# Patient Record
Sex: FEMALE | Race: WHITE | NOT HISPANIC OR LATINO
[De-identification: names, ages, dates, MRNs, and addresses within clinical notes are randomized per-mention and may not be internally consistent; named-entity substitution may affect disease eponyms.]

---

## 2018-11-14 ENCOUNTER — APPOINTMENT (OUTPATIENT)
Dept: NEUROLOGY | Facility: CLINIC | Age: 53
End: 2018-11-14
Payer: COMMERCIAL

## 2018-11-14 DIAGNOSIS — G62.9 POLYNEUROPATHY, UNSPECIFIED: ICD-10-CM

## 2018-11-14 PROCEDURE — 99204 OFFICE O/P NEW MOD 45 MIN: CPT

## 2021-02-11 ENCOUNTER — APPOINTMENT (OUTPATIENT)
Dept: GASTROENTEROLOGY | Facility: CLINIC | Age: 56
End: 2021-02-11

## 2021-05-27 ENCOUNTER — RESULT REVIEW (OUTPATIENT)
Age: 56
End: 2021-05-27

## 2022-01-14 ENCOUNTER — NON-APPOINTMENT (OUTPATIENT)
Age: 57
End: 2022-01-14

## 2022-02-17 ENCOUNTER — APPOINTMENT (OUTPATIENT)
Dept: HEPATOLOGY | Facility: CLINIC | Age: 57
End: 2022-02-17
Payer: COMMERCIAL

## 2022-02-17 VITALS
SYSTOLIC BLOOD PRESSURE: 131 MMHG | WEIGHT: 180 LBS | OXYGEN SATURATION: 98 % | HEIGHT: 64 IN | BODY MASS INDEX: 30.73 KG/M2 | RESPIRATION RATE: 16 BRPM | HEART RATE: 66 BPM | DIASTOLIC BLOOD PRESSURE: 77 MMHG | TEMPERATURE: 97.2 F

## 2022-02-17 DIAGNOSIS — Z82.49 FAMILY HISTORY OF ISCHEMIC HEART DISEASE AND OTHER DISEASES OF THE CIRCULATORY SYSTEM: ICD-10-CM

## 2022-02-17 DIAGNOSIS — Z83.3 FAMILY HISTORY OF DIABETES MELLITUS: ICD-10-CM

## 2022-02-17 DIAGNOSIS — Z85.3 PERSONAL HISTORY OF MALIGNANT NEOPLASM OF BREAST: ICD-10-CM

## 2022-02-17 DIAGNOSIS — Z87.898 PERSONAL HISTORY OF OTHER SPECIFIED CONDITIONS: ICD-10-CM

## 2022-02-17 DIAGNOSIS — B49 UNSPECIFIED MYCOSIS: ICD-10-CM

## 2022-02-17 DIAGNOSIS — Z83.49 FAMILY HISTORY OF OTHER ENDOCRINE, NUTRITIONAL AND METABOLIC DISEASES: ICD-10-CM

## 2022-02-17 DIAGNOSIS — Z80.42 FAMILY HISTORY OF MALIGNANT NEOPLASM OF PROSTATE: ICD-10-CM

## 2022-02-17 PROCEDURE — 99072 ADDL SUPL MATRL&STAF TM PHE: CPT

## 2022-02-17 PROCEDURE — 99205 OFFICE O/P NEW HI 60 MIN: CPT

## 2022-02-23 PROBLEM — Z82.49 FH: CHF (CONGESTIVE HEART FAILURE): Status: ACTIVE | Noted: 2022-02-23

## 2022-02-23 PROBLEM — Z80.42 FAMILY HISTORY OF MALIGNANT NEOPLASM OF PROSTATE: Status: ACTIVE | Noted: 2022-02-23

## 2022-02-23 PROBLEM — Z83.49 FAMILY HISTORY OF THYROID DISEASE: Status: ACTIVE | Noted: 2022-02-23

## 2022-02-23 PROBLEM — Z87.898 HISTORY OF MARIJUANA USE: Status: ACTIVE | Noted: 2022-02-23

## 2022-02-23 PROBLEM — Z82.49 FAMILY HISTORY OF HYPERTENSION: Status: ACTIVE | Noted: 2022-02-23

## 2022-02-23 LAB
25(OH)D3 SERPL-MCNC: 30.4 NG/ML
AFP-TM SERPL-MCNC: 2.6 NG/ML
ALBUMIN SERPL ELPH-MCNC: 4.4 G/DL
ALP BLD-CCNC: 92 U/L
ALT SERPL-CCNC: 23 U/L
ANION GAP SERPL CALC-SCNC: 13 MMOL/L
AST SERPL-CCNC: 32 U/L
BASOPHILS # BLD AUTO: 0.03 K/UL
BASOPHILS NFR BLD AUTO: 0.9 %
BILIRUB DIRECT SERPL-MCNC: 0.2 MG/DL
BILIRUB SERPL-MCNC: 1 MG/DL
BUN SERPL-MCNC: 11 MG/DL
CALCIUM SERPL-MCNC: 9.5 MG/DL
CHLORIDE SERPL-SCNC: 106 MMOL/L
CO2 SERPL-SCNC: 24 MMOL/L
CREAT SERPL-MCNC: 0.62 MG/DL
EOSINOPHIL # BLD AUTO: 0.11 K/UL
EOSINOPHIL NFR BLD AUTO: 3.4 %
GLUCOSE SERPL-MCNC: 82 MG/DL
HCT VFR BLD CALC: 44.1 %
HGB BLD-MCNC: 13.8 G/DL
IMM GRANULOCYTES NFR BLD AUTO: 0.3 %
INR PPP: 1.07 RATIO
LYMPHOCYTES # BLD AUTO: 0.67 K/UL
LYMPHOCYTES NFR BLD AUTO: 21 %
MAN DIFF?: NORMAL
MCHC RBC-ENTMCNC: 30.3 PG
MCHC RBC-ENTMCNC: 31.3 GM/DL
MCV RBC AUTO: 96.9 FL
MITOCHONDRIA AB SER IF-ACNC: NORMAL
MONOCYTES # BLD AUTO: 0.18 K/UL
MONOCYTES NFR BLD AUTO: 5.6 %
NEUTROPHILS # BLD AUTO: 2.19 K/UL
NEUTROPHILS NFR BLD AUTO: 68.8 %
PLATELET # BLD AUTO: NORMAL K/UL
POTASSIUM SERPL-SCNC: 4.4 MMOL/L
PROT SERPL-MCNC: 7.1 G/DL
PT BLD: 12.6 SEC
RBC # BLD: 4.55 M/UL
RBC # FLD: 14 %
SODIUM SERPL-SCNC: 144 MMOL/L
WBC # FLD AUTO: 3.19 K/UL
ZINC SERPL-MCNC: 85 UG/DL

## 2022-02-23 NOTE — HISTORY OF PRESENT ILLNESS
[de-identified] : Ms. Hanson is a 55 yo F with obesity, hypothyroidism, history of breast cancers (left breast cancer s/p mastectomy and left axillary lymph node dissection  followed by radiation, chemotherapy, and tamoxifen; right breast cancer s/p mastectomy  followed by radiation and chemotherapy, with metastatic disease to the chest wall followed by experimental drug therapy as part of a clinical trial in ; also s/p bilateral breast implants), prior cholecystectomy (), chronic iron deficiency anemia, who is being seen for consultation due to recently diagnosed cirrhosis complicated by esophageal and rectal varices as well as indeterminate liver lesions.\par \par Oncology: Dr. Bebo Quach ([facility])\par GI: Dr. Sung Juarez = referring physician\par \par CT [facility] No significant change compared to the previous study. No evidence of metastatic disease. Liver contours are intact. No solid appearing liver lesions. Low-density cystic foci redemonstrated with largest within the posterior aspect of the superior right lobe measuring 2.5 cm unchanged compared to the prior exam probably simple and complex liver cysts. Moderate splenomegaly. \par \par EGD (21): Normal-appearing duodenum, biopsied. Mild antral gastritis, with body and antrum biopsied. Grade 2 esophageal varices without stigmata of bleeding. Banding was not performed.\par \par Colonoscopy (21): Fair prep. Normal-appearing terminal ileum. 1 cm ascending colon polyp removed with hot snare. 8 mm descending colon polyp removed with cold snare. Oozing post polypectomy with two clips placed with resolution of the oozing. One additional clip was attempted to be placed but did not adhere to the mucosa. Sigmoid colon diverticulosis. Small rectal varices.\par \par Abdominal US (21): Heterogeneous echotexture in the liver. No solid lesion in the liver. Several cysts in the liver, largest in the right lobe measuring 2.2 cm with septation. Another cyst in the right lobe measures 1.9 cm. A cyst in the left lobe measures 1.7 cm. Splenomegaly (20.2 cm). Cholecystectomy. No ascites.\par \par US elastography (21):\par 1. Limited study due to overlying bowel gas.\par 2. Significantly heterogeneous liver with coarsening and lobulated margins raising concern for hepatocellular disease/cirrhosis and possible inhomogeneous steatosis with fat sparing. Underlying masses or other diffuse infiltrative process are not excluded on the basis of this exam and furtheer evaluation with enhanced MRI if no contraindication versus multiphase CT is advised.\par 3. Numerous additional cysts, largest (2.6 cm) septated in the right hepatic lobe but no associated color flow.\par 4. Liver stiffness values (median 7.29 kPA) in the range correlating with mild to moderate fibrosis, however assessment is limited due to poor sonographic window as discussed above.\par 5. Status post cholecystectomy without duct dilatation.\par Additional findings: Normal hepatopedal flow in the main portal vein. \par \par Capsule endoscopy (21): Erythematous wall in the duodenum at 00:15:52. Small non-bleeding angioectasia in the ileum at 1:49:59. No other findings.\par \par MRI abdomen with and without contrast with Eovist (21, [facility]): Cirrhosis with portal hypertension manifested by small caliber varices and splenomegaly. No ascites. Patent hepatic vasculature. There are several <2.5 cm lobulated cystic lesions with occasional thin septations scattered throughout the liver, most consistent with biliary hamartomas. There are multiple <1.5 cm lesions scattered throughout the liver, near isointense on noncontrast images, a few showing arterial hyperenhancement, but are best shown as enhancing foci on hepatocyte phase images. No washout. Lesions are consistent with dysplastic nodules.\par \par MRI abdomen with and without contrast with Eovist (21, [facility]): Cirrhosis. There is diffuse signal drop out in phase relative to out of phase consistent with iron deposition. Evidence of portal hypertension including small esophageal varices in the distal esophagus and splenomegaly. No ascites. Patent hepatic vasculature. No evidence of HCC. There are numerous arterially isointense, progressively enhancing nodules demonstrating hyperintense signal on hepatobiliary phase and no restricted diffusion scattered throughout the liver. These are consistent with regenerative nodules. There are two arterially enhancing subcentimeter subcapsular foci in segment 7 measuring 0.6 x 0.8 cm and 0.6 x 0.6 cm respectively which also demonstrate retained contrast on the hepatobiliary phase and no restricted diffusion. These could represent dysplastic or regenerative nodules. Several stable and septated cysts are also scattered throughout the liver.\par \par CT chest/abdomen/pelvis with contrast (2/15/22, [facility]): No evidence of metastatic disease. Cirrhotic liver containing simplee cysts, without evidence of HCC. Portal hypertension manifested by coronary varices, without discernible portal thrombus or ascites. Normal spleen. \par \par She reports that she has had splenomegaly noted on imaging for approximately 2 years and thrombocytopenia noted on labs for approximately 4 years, both first noted by her oncologist. In the spring of , she had an episode of melena that self-resolved, but prompted her oncologist to refer her to Dr. Juarez, who did an EGD and colonoscopy (as above) with findings including esophageal and rectal varices. This led to her diagnosis of cirrhosis and further liver imaging, as above. Per notes from Dr. Juarez, gastric biopsies were negative for H pylori and duodenal biopsies were negative for celiac sprue.\par \par She has not had any other episodes of overt bleeding. No history of hematemesis. She denies any history of jaundice, scleral icterus, abdominal distension, paracentesis, or lower extremity edema. She has occasional, sporadic abdominal cramping. She denies any history of overt confusion, but says that for the past year, she has become more forgetful and also sometimes has difficulty concentrating.\par \par She says that Dr. Juarez discussed the possibility of beta-blocker therapy with her but held off, possibly because she says she has episodes of vasovagal syncope, with intermittent lightheadedness and a "tendency to faint".\par \par She weighed 206 lbs at her heaviest but has intentionally lost 20 lbs in the past 6 months. She admits that she doesn't "eat healthy all the time" and that "food is my addiction."\par \par She used to be a "social drinker" but says that during the COVID-19 pandemic, she began to drink alcohol more regularly, initially 1 drink 2 nights/week and then up to heaviest drinking of 2-3 drinks/week, typically martinis, hard seltzer, or occasionally beer. She quit drinking alcohol altogether after Dr. Juarez diagnosed her with cirrhosis, apart from a single drink while on vacation for [place] Years and apart from "zero alcohol" beer.\par \par She has no known family history of liver disease. Her father  in  of CHF and had a prior history of a pacemaker implantation, thyroid disease, and prostate cancer. Her paternal grandmother had ovarian cancer. Her mother is alive, age 86 years, and has mild cognitive impairment, hypertension, diabetes, and dyslipidemia. Her older brother has thyroid disease. 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 She is  and has been with her wife, Paula, for the past 30 years. No children. She admits to using marijuana recreationally in the past and being interested in trying medical marijuana. She does not have any history of intranasal or injection drug use.\[facility]:\[facility] 21: Na 141, Cr 0.59, ALP 79, AST 19, ALT 13, TB 0.5, TP 6.2, Alb 3.7, INR 1.0, WBC 2.2, Hb 10.4, HCT 32.0, MCV 79.4, RDW 16.4, Plt 112, transferrin saturation 9%, ferritin 7, AFP 2.7\[facility] 22: Na 141, Cr 0.60, ALP 95, AST 31, ALT 22, TB 0.9, TP 7.0, Alb 4.4, WBC 4.0, Hb 13.7, HCT 39.6, MCV 87, RDW 12.8, Plt 86, transferrin saturation 27%, ferritin 206, AFP <2.5\[facility] \[facility] Additional labs: HAV IgM non-reactive, HAV total Ab non-reactive, HBsAg non-reactive, HBcIgM non-reactive, HBcAb non-reactive, HBsAb non-reactive, HCV Ab non-reactive, CAMILLE negative, ASMA negative, LKM negative, AMA negative, A1AT 155, ceruloplasmin 38, CEA 1.5 (22)

## 2022-02-23 NOTE — ASSESSMENT
[FreeTextEntry1] : 55 yo F with obesity, hypothyroidism, history of breast cancers (left breast cancer s/p mastectomy and left axillary lymph node dissection 2003 followed by radiation, chemotherapy, and tamoxifen; right breast cancer s/p mastectomy 2008 followed by radiation and chemotherapy, with metastatic disease to the chest wall followed by experimental drug therapy as part of a clinical trial in 2012; also s/p bilateral breast implants), prior cholecystectomy (2015), chronic iron deficiency anemia, and recently diagnosed cirrhosis complicated by non-bleeding esophageal and rectal varices, splenomegaly with thrombocytopenia, and indeterminate liver lesions, as well as possible covert hepatic encephalopathy.\par \par # Possible covert hepatic encephalopathy:\par - No history of overt HE, but with reported difficulty with concentration and forgetfulness for the past year.\par - Start trial of lactulose, and discussed titrating as needed to achieve/maintain 2-4 stools/day.\par - Will evaluate for any symptomatic improvement after a few weeks of lactulose therapy, and can consider addition of rifaximin if needed.\par \par # Non-bleeding esophageal and rectal varices:\par - She is not hypotensive, but appears to be a poor candidate for NSBB for primary prophylaxis due to a history of episodic vasovagal syncope and intermittent lightheadedness, which NSBB may exacerbate.\par - Recommended repeat EGD for serial band ligation of varices as alternative primary prophylaxis. She will be referred back to Dr. Juarez for procedure(s).\par - We briefly discussed that TIPS can be used to alleviate portal hypertension and prevent bleeding from varices, but that this is not currently indicated given that she has no prior episodes of hemorrhage or other indications for elective TIPS.\par \par # Etiology of cirrhosis: Likely secondary to nonalcoholic steatohepatitis (CHARLES) given heterogeneous steatosis noted on prior imaging. No significant alcohol history (no reported consumption in excess of 7 drinks/week) and no serologic evidence of chronic HBV or HCV. Prior laboratory work-up done by Dr. Juarez has excluded other less common causes of cirrhosis. It is also possible that prior chemotherapy may have contributed to development of cirrhosis and/or portal hypertension. I am not currently recommending pursuing liver biopsy for further evaluation as this is unlikely to  based on her lab results.\par \par # Indeterminate liver lesions:\par - Based on her last MRI (12/2021), she appears to have two subcentimeter lesions with arterial enhancement, without washout or restricted diffusion, with retained contrast on the hepatobiliary phase. LIRADS classification was not used in the report but by description these are LR-3 lesions (indeterminate). Additional arterially isointense nodules with hyperintensity on hepatobiliary phase were also noted. \par - The lesions appear to be overall stable compared to her prior MRI (7/2021) when they were also noted to be <1.5 cm in size.\par - More recent CT (2/2022) appears to have been a single phase study and therefore was insufficient for surveillance and further characterization of these lesions.\par - Recommend repeat MRI in 6 months (6/2022) for surveillance, ordered today.\par \par # Cirrhosis:\par - She currently has Child-Salas class A cirrhosis with MELD-Na score 6 based on her last labs.\par - No ascites on prior imaging or on exam today. Not currently on diuretics.\par - We discussed that she does not have any indication for liver transplantation evaluation at this time. We discussed the potential risks of development of ascites, SBP or othr infections, variceal hemorrhage, overt hepatic encephalopathy, PVT, and HCC and alert signs for her to seek medical attention.\par \par # Health maintenance in cirrhosis:\par - Ms. GLASS was counseled to: abstain from alcohol and all illicit drugs; avoid use of herbal and dietary supplements due to potential hepatotoxicity; limit use of acetaminophen to <2 grams per day; avoid use of nonsteroidal antiinflammatory drugs (NSAIDs) as these can lead to diuretic resistance and precipitate renal dysfunction in patients with advanced liver disease; avoid eating any unpasteurized dairy products; avoid eating any raw or undercooked eggs, fish, poultry, or meat; and avoid eating raw/steamed oysters or other shellfish to avoid risk of Vibrio infection.\par \par Next follow-up: 2 months

## 2022-02-23 NOTE — CONSULT LETTER
[Dear  ___] : Dear  [unfilled], [( Thank you for referring [unfilled] for consultation for _____ )] : Thank you for referring [unfilled] for consultation for [unfilled] [Please see my note below.] : Please see my note below. [Consult Closing:] : Thank you very much for allowing me to participate in the care of this patient.  If you have any questions, please do not hesitate to contact me. [FreeTextEntry2] : Dr. Sung Juarez [FreeTextEntry1] : She appears to be a risky candidate for primary prophylaxis against variceal hemorrhage with beta-blocker therapy due to nilda history of vasovagal syncope and intermittent lightheadedness. I suggest that you repeat EGD serially for band ligation for alternative primary prophylaxis. If you prefer that I perform these procedures, please let me know and I would be happy to accommodate.\par \par I am planning to repeat her MRI in June for surveillance of the indeterminate liver lesions. [FreeTextEntry3] : Sincerely,\par \par Gisel Rodriguez M.D., Ph.D.\par Director, Women's Liver Health Program, [facility]\par Transplant Hepatology, [facility]\par  of Medicine\par [employer] [facility] [employer] [facility]\par [address]\[place] [address]\par Tel: [phone]\par Fax: [phone]\par Cell: [phone]\par E-mail: [email or web address]

## 2022-06-16 ENCOUNTER — RX ONLY (RX ONLY)
Age: 57
End: 2022-06-16

## 2022-06-16 ENCOUNTER — OFFICE (OUTPATIENT)
Dept: URBAN - METROPOLITAN AREA CLINIC 100 | Facility: CLINIC | Age: 57
Setting detail: OPHTHALMOLOGY
End: 2022-06-16
Payer: COMMERCIAL

## 2022-06-16 DIAGNOSIS — D21.0: ICD-10-CM

## 2022-06-16 DIAGNOSIS — D49.2: ICD-10-CM

## 2022-06-16 PROCEDURE — 92002 INTRM OPH EXAM NEW PATIENT: CPT | Performed by: OPHTHALMOLOGY

## 2022-06-16 PROCEDURE — 92285 EXTERNAL OCULAR PHOTOGRAPHY: CPT | Performed by: OPHTHALMOLOGY

## 2022-06-16 PROCEDURE — 67840 REMOVE EYELID LESION: CPT | Performed by: OPHTHALMOLOGY

## 2022-06-16 ASSESSMENT — CONFRONTATIONAL VISUAL FIELD TEST (CVF)
OS_FINDINGS: FULL
OD_FINDINGS: FULL

## 2022-06-16 ASSESSMENT — TONOMETRY
OS_IOP_MMHG: 13
OD_IOP_MMHG: 12

## 2022-06-16 ASSESSMENT — VISUAL ACUITY
OD_BCVA: 20/30
OS_BCVA: 20/30

## 2022-06-30 ENCOUNTER — OFFICE (OUTPATIENT)
Dept: URBAN - METROPOLITAN AREA CLINIC 100 | Facility: CLINIC | Age: 57
Setting detail: OPHTHALMOLOGY
End: 2022-06-30
Payer: COMMERCIAL

## 2022-06-30 DIAGNOSIS — D49.2: ICD-10-CM

## 2022-06-30 DIAGNOSIS — H02.834: ICD-10-CM

## 2022-06-30 DIAGNOSIS — H02.831: ICD-10-CM

## 2022-06-30 PROBLEM — H52.7 REFRACTIVE ERROR: Status: ACTIVE | Noted: 2022-06-16

## 2022-06-30 PROBLEM — D21.0 BENIGN LESION FACE ; RIGHT EYE: Status: ACTIVE | Noted: 2022-06-16

## 2022-06-30 PROBLEM — Z85.20 HISTORY OF BREAST CANCER: Status: ACTIVE | Noted: 2022-06-16

## 2022-06-30 PROCEDURE — 92012 INTRM OPH EXAM EST PATIENT: CPT | Performed by: OPHTHALMOLOGY

## 2022-06-30 ASSESSMENT — CONFRONTATIONAL VISUAL FIELD TEST (CVF)
OD_FINDINGS: FULL
OS_FINDINGS: FULL

## 2022-06-30 ASSESSMENT — LID POSITION - DERMATOCHALASIS
OS_DERMATOCHALASIS: LUL
OD_DERMATOCHALASIS: RUL

## 2022-06-30 ASSESSMENT — VISUAL ACUITY
OD_BCVA: 20/30
OS_BCVA: 20/30

## 2022-07-12 ENCOUNTER — LABORATORY RESULT (OUTPATIENT)
Age: 57
End: 2022-07-12

## 2022-07-12 ENCOUNTER — APPOINTMENT (OUTPATIENT)
Dept: HEPATOLOGY | Facility: CLINIC | Age: 57
End: 2022-07-12

## 2022-07-12 VITALS
TEMPERATURE: 97.6 F | DIASTOLIC BLOOD PRESSURE: 73 MMHG | HEIGHT: 64 IN | WEIGHT: 190 LBS | RESPIRATION RATE: 16 BRPM | SYSTOLIC BLOOD PRESSURE: 112 MMHG | OXYGEN SATURATION: 95 % | HEART RATE: 68 BPM | BODY MASS INDEX: 32.44 KG/M2

## 2022-07-12 PROCEDURE — 99214 OFFICE O/P EST MOD 30 MIN: CPT

## 2022-07-15 LAB
25(OH)D3 SERPL-MCNC: 36.6 NG/ML
AFP-TM SERPL-MCNC: 2.7 NG/ML
ALBUMIN SERPL ELPH-MCNC: 4.8 G/DL
ALP BLD-CCNC: 91 U/L
ALT SERPL-CCNC: 26 U/L
ANION GAP SERPL CALC-SCNC: 15 MMOL/L
AST SERPL-CCNC: 37 U/L
BASOPHILS # BLD AUTO: 0.03 K/UL
BASOPHILS NFR BLD AUTO: 0.9 %
BILIRUB DIRECT SERPL-MCNC: 0.2 MG/DL
BILIRUB SERPL-MCNC: 0.8 MG/DL
BUN SERPL-MCNC: 11 MG/DL
CALCIUM SERPL-MCNC: 9.8 MG/DL
CANCER AG19-9 SERPL-ACNC: 17 U/ML
CHLORIDE SERPL-SCNC: 105 MMOL/L
CO2 SERPL-SCNC: 23 MMOL/L
CREAT SERPL-MCNC: 0.62 MG/DL
EGFR: 103 ML/MIN/1.73M2
EOSINOPHIL # BLD AUTO: 0.13 K/UL
EOSINOPHIL NFR BLD AUTO: 4 %
ESTIMATED AVERAGE GLUCOSE: 105 MG/DL
GLUCOSE SERPL-MCNC: 90 MG/DL
HBA1C MFR BLD HPLC: 5.3 %
HCT VFR BLD CALC: 42.4 %
HGB BLD-MCNC: 13.7 G/DL
IMM GRANULOCYTES NFR BLD AUTO: 0.3 %
INR PPP: 1.13 RATIO
LYMPHOCYTES # BLD AUTO: 0.62 K/UL
LYMPHOCYTES NFR BLD AUTO: 19.2 %
MAN DIFF?: NORMAL
MCHC RBC-ENTMCNC: 29.7 PG
MCHC RBC-ENTMCNC: 32.3 GM/DL
MCV RBC AUTO: 92 FL
MONOCYTES # BLD AUTO: 0.26 K/UL
MONOCYTES NFR BLD AUTO: 8 %
NEUTROPHILS # BLD AUTO: 2.18 K/UL
NEUTROPHILS NFR BLD AUTO: 67.6 %
PLATELET # BLD AUTO: 78 K/UL
POTASSIUM SERPL-SCNC: 4.5 MMOL/L
PROT SERPL-MCNC: 7.2 G/DL
PT BLD: 13.1 SEC
RBC # BLD: 4.61 M/UL
RBC # FLD: 14.3 %
SODIUM SERPL-SCNC: 142 MMOL/L
WBC # FLD AUTO: 3.23 K/UL

## 2022-07-15 NOTE — HISTORY OF PRESENT ILLNESS
[de-identified] : Ms. Hanson is a 58 yo F with obesity, hypothyroidism, history of breast cancers (left breast cancer s/p mastectomy and left axillary lymph node dissection 2003 followed by radiation, chemotherapy, and tamoxifen; right breast cancer s/p mastectomy 2008 followed by radiation and chemotherapy, with metastatic disease to the chest wall followed by experimental drug therapy as part of a clinical trial in 2012; also s/p bilateral breast implants), prior cholecystectomy (2015), chronic iron deficiency anemia, who is being seen for follow-up of cirrhosis complicated by esophageal and rectal varices, possible covert hepatic encephalopathy, and indeterminate liver lesions.\par \par Oncology: Dr. Bebo Quach ([facility])\par GI: Dr. Sung Juarez = referring physician\par \par CT chest/abdomen/pelvis with contrast (4/16/21, [facility]): No significant change compared to the previous study. No evidence of metastatic disease. Liver contours are intact. No solid appearing liver lesions. Low-density cystic foci redemonstrated with largest within the posterior aspect of the superior right lobe measuring 2.5 cm unchanged compared to the prior exam probably simple and complex liver cysts. Moderate splenomegaly. \par \par EGD (5/27/21): Normal-appearing duodenum, biopsied. Mild antral gastritis, with body and antrum biopsied. Grade 2 esophageal varices without stigmata of bleeding. Banding was not performed.\par \par Colonoscopy (5/27/21): Fair prep. Normal-appearing terminal ileum. 1 cm ascending colon polyp removed with hot snare. 8 mm descending colon polyp removed with cold snare. Oozing post polypectomy with two clips placed with resolution of the oozing. One additional clip was attempted to be placed but did not adhere to the mucosa. Sigmoid colon diverticulosis. Small rectal varices.\par \par Abdominal US (6/4/21): Heterogeneous echotexture in the liver. No solid lesion in the liver. Several cysts in the liver, largest in the right lobe measuring 2.2 cm with septation. Another cyst in the right lobe measures 1.9 cm. A cyst in the left lobe measures 1.7 cm. Splenomegaly (20.2 cm). Cholecystectomy. No ascites.\par \par US elastography (6/22/21):\par 1. Limited study due to overlying bowel gas.\par 2. Significantly heterogeneous liver with coarsening and lobulated margins raising concern for hepatocellular disease/cirrhosis and possible inhomogeneous steatosis with fat sparing. Underlying masses or other diffuse infiltrative process are not excluded on the basis of this exam and furtheer evaluation with enhanced MRI if no contraindication versus multiphase CT is advised.\par 3. Numerous additional cysts, largest (2.6 cm) septated in the right hepatic lobe but no associated color flow.\par 4. Liver stiffness values (median 7.29 kPA) in the range correlating with mild to moderate fibrosis, however assessment is limited due to poor sonographic window as discussed above.\par 5. Status post cholecystectomy without duct dilatation.\par Additional findings: Normal hepatopedal flow in the main portal vein. \par \par Capsule endoscopy (7/21/21): Erythematous wall in the duodenum at 00:15:52. Small non-bleeding angioectasia in the ileum at 1:49:59. No other findings.\par \par MRI abdomen with and without contrast with Eovist (7/28/21, [facility]): Cirrhosis with portal hypertension manifested by small caliber varices and splenomegaly. No ascites. Patent hepatic vasculature. There are several <2.5 cm lobulated cystic lesions with occasional thin septations scattered throughout the liver, most consistent with biliary hamartomas. There are multiple <1.5 cm lesions scattered throughout the liver, near isointense on noncontrast images, a few showing arterial hyperenhancement, but are best shown as enhancing foci on hepatocyte phase images. No washout. Lesions are consistent with dysplastic nodules.\par \par MRI abdomen with and without contrast with Eovist (12/16/21, [facility]): Cirrhosis. There is diffuse signal drop out in phase relative to out of phase consistent with iron deposition. Evidence of portal hypertension including small esophageal varices in the distal esophagus and splenomegaly. No ascites. Patent hepatic vasculature. No evidence of HCC. There are numerous arterially isointense, progressively enhancing nodules demonstrating hyperintense signal on hepatobiliary phase and no restricted diffusion scattered throughout the liver. These are consistent with regenerative nodules. There are two arterially enhancing subcentimeter subcapsular foci in segment 7 measuring 0.6 x 0.8 cm and 0.6 x 0.6 cm respectively which also demonstrate retained contrast on the hepatobiliary phase and no restricted diffusion. These could represent dysplastic or regenerative nodules. Several stable and septated cysts are also scattered throughout the liver.\par \par CT chest/abdomen/pelvis with contrast (2/15/22, [facility]): No evidence of metastatic disease. Cirrhotic liver containing simplee cysts, without evidence of HCC. Portal hypertension manifested by coronary varices, without discernible portal thrombus or ascites. Normal spleen. \par \par She was last seen by me on 2/17/22 and is here today for follow-up. She has not had any follow-up imaging done yet. She said that she never received the authorization number to schedule the repeat MRI previously ordered for 6/2022. She did see Dr. Juarez on 6/30/22 for follow-up and consultation re: endoscopic procedures and is now scheduled for EGD and colonoscopy with him on 8/10/22. She admits that since her last visit, she re-started drinking "a drink or two" occasionally socially as well as "zero" beer more frequently, despite her known cirrhosis and being told by myself and Dr. Juarez about the importance of complete abstinence. She denies alcohol urges or cravings, though, saying "food is my addiction." She regained some of the weight she had previously lost. She has not noticed any significant improvement in her forgetfulness since starting the lactulose after our last visit but has found it helpful for preventing constipation. No overt confusion.\par \par She works as a CPA. She is  and has been with her wife, Paula, for the past 30 years. No children. She admits to using marijuana recreationally in the past and being interested in trying medical marijuana. She does not have any history of intranasal or injection drug use.

## 2022-07-15 NOTE — ASSESSMENT
[FreeTextEntry1] : 58 yo F with obesity, hypothyroidism, history of breast cancers (left breast cancer s/p mastectomy and left axillary lymph node dissection 2003 followed by radiation, chemotherapy, and tamoxifen; right breast cancer s/p mastectomy 2008 followed by radiation and chemotherapy, with metastatic disease to the chest wall followed by experimental drug therapy as part of a clinical trial in 2012; also s/p bilateral breast implants), prior cholecystectomy (2015), chronic iron deficiency anemia, and cirrhosis complicated by non-bleeding esophageal and rectal varices, splenomegaly with thrombocytopenia, and indeterminate liver lesions, as well as possible covert hepatic encephalopathy.\par \par # Possible covert hepatic encephalopathy:\par - No history of overt HE, but with reported difficulty with concentration and forgetfulness for the past year.\par - Continue lactulose (started 2/2022), titrated as needed to maintain 2-4 BMs/day. Has been tolerating.\par \par # Non-bleeding esophageal and rectal varices:\par - She is not hypotensive, but appears to be a poor candidate for NSBB for primary prophylaxis due to a history of episodic vasovagal syncope and intermittent lightheadedness that NSBB may exacerbate.\par - She is scheduled for repeat EGD with Dr. Juarez on 8/10/22 for serial band ligation of varices as alternative primary prophylaxis.\par - We previously discussed that TIPS can be used to alleviate portal hypertension and prevent bleeding from varices, but that this is not currently indicated given that she has no prior episodes of hemorrhage or other indications for elective TIPS.\par \par # Etiology of cirrhosis: Likely secondary to nonalcoholic steatohepatitis (CHARLES) given heterogeneous steatosis noted on prior imaging as well as possible contribution from alcohol though reported consumption has not been in excess of 7 standard drinks/week. No serologic evidence of chronic HBV or HCV. Prior laboratory work-up done by Dr. Juarez excluded other less common causes of cirrhosis. It is also possible that prior chemotherapy may have contributed to development of cirrhosis and/or portal hypertension. I am not currently recommending pursuing liver biopsy for further evaluation as this is unlikely to  based on her lab results.\par \par # Indeterminate liver lesions:\par - Based on her last MRI (12/2021), she appears to have two subcentimeter lesions with arterial enhancement, without washout or restricted diffusion, with retained contrast on the hepatobiliary phase. LIRADS classification was not used in the report but by description these are LR-3 lesions (indeterminate). Additional arterially isointense nodules with hyperintensity on hepatobiliary phase were also noted. \par - The lesions appear to be overall stable compared to her prior MRI (7/2021) when they were also noted to be <1.5 cm in size.\par - More recent CT (2/2022) appears to have been a single phase study and therefore was insufficient for surveillance and further characterization of these lesions.\par - Repeat 6 month MRI (due 6/2022) ordered at her last visit for surveillance but not yet done. 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 Not currently on diuretics.\par - We have discussed that she does not have any indication for liver transplantation evaluation at this time. We have discussed the potential risks of development of ascites, SBP or other infections, variceal hemorrhage, overt hepatic encephalopathy, PVT, and HCC and alert signs for her to seek medical attention.\par \par # Health maintenance in cirrhosis:\par - Today, we again discussed the importance of 100% alcohol abstinence including no "zero alcohol" drinks that do still continue small amounts of alcohol. We discussed potential harms of alcohol (even in moderation) including accelerated progression of her cirrhosis, increased portal hypertension and risk of hepatic decompensation, and earlier mortality. We also discussed that continued drinking despite known cirrhosis is a significant concern for future transplant candidacy should that become necessary. We discussed the availability of pharmacotherapy that can reduce urges and cravings for alcohol and that I am happy to prescribe her such medication if she feels it will be helpful. She declined a prescription today but will reach out to me anytime if she changes her mind, particularly if she is having trouble not drinking during social occasions or otherwise.\par - Ms. GLASS was counseled to: avoid use of herbal and dietary supplements due to potential hepatotoxicity; limit use of acetaminophen to <2 grams per day; avoid use of nonsteroidal antiinflammatory drugs (NSAIDs) as these can lead to diuretic resistance and precipitate renal dysfunction in patients with advanced liver disease; avoid eating any unpasteurized dairy products; avoid eating any raw or undercooked eggs, fish, poultry, or meat; and avoid eating raw/steamed oysters or other shellfish to avoid risk of Vibrio infection.\par \par Next follow-up: 6 months

## 2022-07-15 NOTE — CONSULT LETTER
[Dear  ___] : Dear  [unfilled], [( Thank you for referring [unfilled] for consultation for _____ )] : Thank you for referring [unfilled] for consultation for [unfilled] [Please see my note below.] : Please see my note below. [Consult Closing:] : Thank you very much for allowing me to participate in the care of this patient.  If you have any questions, please do not hesitate to contact me. [Courtesy Letter:] : I had the pleasure of seeing your patient, [unfilled], in my office today. [FreeTextEntry2] : Dr. Sung Juarez [FreeTextEntry1] : I strongly advised her about the importance of 100% alcohol abstinence, as she resumed drinking on occasion since my last visit with her and despite the prior counseling that she received from both of us. I offered her pharmacotherapy to reduce alcohol urges/cravings but she declined and will reach out to me if she changes her mind, particularly if she is having difficulty remaining abstinent. We are trying to get her follow-up MRI done as soon as possible. [FreeTextEntry3] : Sincerely,\par \par Gisel Rodriguez M.D., Ph.D.\par Director, Women's Liver Health Program, [facility]\par Transplant Hepatology, [facility]\par  of Medicine\par [facility]\par [address]\[place] [address]\par Tel: [phone]\par Fax: [phone]\par Cell: [phone]\par E-mail: [email or web address]

## 2022-07-18 LAB — PHOSPHATIDYETHANOL (PETH), WHOLE BLOOD: NEGATIVE NG/ML

## 2022-07-19 ENCOUNTER — NON-APPOINTMENT (OUTPATIENT)
Age: 57
End: 2022-07-19

## 2022-08-10 ENCOUNTER — RESULT REVIEW (OUTPATIENT)
Age: 57
End: 2022-08-10

## 2022-09-06 ENCOUNTER — RX RENEWAL (OUTPATIENT)
Age: 57
End: 2022-09-06

## 2022-09-06 RX ORDER — LACTULOSE 10 G/15ML
10 SOLUTION ORAL DAILY
Qty: 473 | Refills: 5 | Status: ACTIVE | COMMUNITY
Start: 2022-02-17 | End: 1900-01-01

## 2022-09-28 ENCOUNTER — NON-APPOINTMENT (OUTPATIENT)
Age: 57
End: 2022-09-28

## 2022-10-21 ENCOUNTER — APPOINTMENT (OUTPATIENT)
Dept: RADIOLOGY | Facility: CLINIC | Age: 57
End: 2022-10-21

## 2022-10-21 ENCOUNTER — APPOINTMENT (OUTPATIENT)
Dept: MRI IMAGING | Facility: CLINIC | Age: 57
End: 2022-10-21

## 2022-10-21 ENCOUNTER — RESULT REVIEW (OUTPATIENT)
Age: 57
End: 2022-10-21

## 2022-10-21 ENCOUNTER — OUTPATIENT (OUTPATIENT)
Dept: OUTPATIENT SERVICES | Facility: HOSPITAL | Age: 57
LOS: 1 days | End: 2022-10-21
Payer: COMMERCIAL

## 2022-10-21 DIAGNOSIS — K74.60 UNSPECIFIED CIRRHOSIS OF LIVER: ICD-10-CM

## 2022-10-21 DIAGNOSIS — K75.81 NONALCOHOLIC STEATOHEPATITIS (NASH): ICD-10-CM

## 2022-10-21 DIAGNOSIS — Z90.49 ACQUIRED ABSENCE OF OTHER SPECIFIED PARTS OF DIGESTIVE TRACT: Chronic | ICD-10-CM

## 2022-10-21 DIAGNOSIS — Z00.8 ENCOUNTER FOR OTHER GENERAL EXAMINATION: ICD-10-CM

## 2022-10-21 DIAGNOSIS — Z98.89 OTHER SPECIFIED POSTPROCEDURAL STATES: Chronic | ICD-10-CM

## 2022-10-21 DIAGNOSIS — Z90.13 ACQUIRED ABSENCE OF BILATERAL BREASTS AND NIPPLES: Chronic | ICD-10-CM

## 2022-10-21 DIAGNOSIS — K76.82 HEPATIC ENCEPHALOPATHY: ICD-10-CM

## 2022-10-21 PROCEDURE — 74018 RADEX ABDOMEN 1 VIEW: CPT | Mod: 26

## 2022-10-21 PROCEDURE — 71045 X-RAY EXAM CHEST 1 VIEW: CPT | Mod: 26

## 2022-10-21 PROCEDURE — 74183 MRI ABD W/O CNTR FLWD CNTR: CPT

## 2022-10-21 PROCEDURE — 71045 X-RAY EXAM CHEST 1 VIEW: CPT

## 2022-10-21 PROCEDURE — A9585: CPT

## 2022-10-21 PROCEDURE — 74183 MRI ABD W/O CNTR FLWD CNTR: CPT | Mod: 26

## 2022-10-21 PROCEDURE — 74018 RADEX ABDOMEN 1 VIEW: CPT

## 2022-10-28 ENCOUNTER — NON-APPOINTMENT (OUTPATIENT)
Age: 57
End: 2022-10-28

## 2022-11-30 ENCOUNTER — NON-APPOINTMENT (OUTPATIENT)
Age: 57
End: 2022-11-30

## 2022-12-14 ENCOUNTER — APPOINTMENT (OUTPATIENT)
Dept: HEPATOLOGY | Facility: CLINIC | Age: 57
End: 2022-12-14

## 2022-12-14 VITALS
SYSTOLIC BLOOD PRESSURE: 108 MMHG | WEIGHT: 184 LBS | TEMPERATURE: 97.9 F | DIASTOLIC BLOOD PRESSURE: 69 MMHG | RESPIRATION RATE: 16 BRPM | HEIGHT: 63 IN | OXYGEN SATURATION: 96 % | BODY MASS INDEX: 32.6 KG/M2 | HEART RATE: 69 BPM

## 2022-12-14 PROCEDURE — 99214 OFFICE O/P EST MOD 30 MIN: CPT

## 2022-12-19 NOTE — ASSESSMENT
[FreeTextEntry1] : 56 yo F with obesity, hypothyroidism, history of breast cancers (left breast cancer s/p mastectomy and left axillary lymph node dissection 2003 followed by radiation, chemotherapy, and tamoxifen; right breast cancer s/p mastectomy 2008 followed by radiation and chemotherapy, with metastatic disease to the chest wall followed by experimental drug therapy as part of a clinical trial in 2012; also s/p bilateral breast implants), prior cholecystectomy (2015), chronic iron deficiency anemia, who is being seen for follow-up of cirrhosis complicated by non-bleeding esophageal and rectal varices, possible covert hepatic encephalopathy, and indeterminate liver lesions.\par \par # Possible covert hepatic encephalopathy:\par - No history of overt HE, but with reported difficulty with concentration and forgetfulness for the past year, which is now improved.\par - Continue lactulose (started 2/2022), titrated as needed to maintain 2-4 BMs/day. She has been tolerating this therapy. No current indication to add rifaximin.\par \par # Non-bleeding esophageal and rectal varices:\par - She is not hypotensive, but appears to be a poor candidate for NSBB for primary prophylaxis due to a history of episodic vasovagal syncope and intermittent lightheadedness that NSBB may exacerbate.\par - She is scheduled for repeat EGD with Dr. Juarez on 12/21/22 for serial band ligation of varices as alternative primary prophylaxis. We discussed that if she is continuing to repeatedly need band ligation, we can reconsider a trial of NSBB.\par - We previously discussed that TIPS can be used to alleviate portal hypertension and prevent bleeding from varices, but that this is not currently indicated given that she has no prior episodes of hemorrhage or other indications for elective TIPS.\par \par # Etiology of cirrhosis: Likely secondary to nonalcoholic steatohepatitis (CHALRES) given heterogeneous steatosis noted on prior imaging as well as possible contribution from alcohol though reported consumption had not been in excess of 7 standard drinks/week (and now abstinent altogether for the past 5-6 months). No serologic evidence of chronic HBV or HCV. Prior laboratory work-up done by Dr. Juarez excluded other less common causes of cirrhosis. It is also possible that prior chemotherapy may have contributed to development of cirrhosis and/or portal hypertension. I am not currently recommending pursuing liver biopsy for further evaluation as this is unlikely to  based on her lab results.\par \par # Indeterminate liver lesions:\par - Based on her last MRI (12/2021), she appeared to have two subcentimeter lesions with arterial enhancement, without washout or restricted diffusion, with retained contrast on the hepatobiliary phase. LIRADS classification was not used in the report but by description these are LR-3 lesions (indeterminate). Additional arterially isointense nodules with hyperintensity on hepatobiliary phase were also noted. The lesions appeared to be overall stable compared to her prior MRI (7/2021) when they were also noted to be <1.5 cm in size.\par - Most recent MRI (10/21/22) with no suspicious hepatic lesions noted.\par - Continue q6 month surveillance, next due in 4/2023 and ordered today.  \par \par # Celiac artery stenosis:\par - Most recent MRI (10/21/22) with severe proximal stenosis of the celiac artery with increased poststenotic dilatation since 2015. She is currently asymptomatic.\par - I have reached out to hepatobiliary surgery Dr. Alessandro Villeda for input including whether this necessitates any further investigation or surgical or endovascular intervention. Meanwhile, will plan for repeat MRI surveillance as above.\par \par # Cirrhosis:\par - She currently has Child-Salas class A cirrhosis with low MELD-Na score 8 based on her last labs (10/2022-11/2022). ABO A.\par - No ascites on prior imaging or on exam today. Not currently on diuretics.\par - We have discussed that she does not have any indication for liver transplantation evaluation at this time. We have discussed the potential risks of development of ascites, SBP or other infections, variceal hemorrhage, overt hepatic encephalopathy, PVT, and HCC and alert signs for her to seek medical attention.\par \par # Health maintenance in cirrhosis:\par - We previously discussed the importance of 100% alcohol abstinence including no "zero alcohol" drinks that do still continue small amounts of alcohol. She is now sober for the past 5-6 months. She is aware that we will do periodic biomarker surveillance. She declined AUD pharmacotherapy previously.\par - Ms. GLASS was counseled to: avoid use of herbal and dietary supplements due to potential hepatotoxicity (including advised to stop her current herbal ingredient-containing liquid multivitamin that has multiple potential hepatotoxins and switch to a basic non-herbal containing multivitamin); limit use of acetaminophen to <2 grams per day; avoid use of nonsteroidal antiinflammatory drugs (NSAIDs) as these can lead to diuretic resistance and precipitate renal dysfunction in patients with advanced liver disease; avoid eating any unpasteurized dairy products; avoid eating any raw or undercooked eggs, fish, poultry, or meat; and avoid eating raw/steamed oysters or other shellfish to avoid risk of Vibrio infection.\par \par Next follow-up: 6 months

## 2022-12-19 NOTE — CONSULT LETTER
[Dear  ___] : Dear  [unfilled], [Courtesy Letter:] : I had the pleasure of seeing your patient, [unfilled], in my office today. [( Thank you for referring [unfilled] for consultation for _____ )] : Thank you for referring [unfilled] for consultation for [unfilled] [Please see my note below.] : Please see my note below. [Consult Closing:] : Thank you very much for allowing me to participate in the care of this patient.  If you have any questions, please do not hesitate to contact me. [FreeTextEntry2] : Dr. Sung Juarez [FreeTextEntry3] : Sincerely,\par \par Gisel Rodriguez M.D., Ph.D.\par Director, Women's Liver Health Program, [facility]\par Transplant Hepatology, [facility]\par  of Medicine\par [facility] at [facility]\par [address]\[place] [address]\par Tel: [phone]\par Fax: [phone]\par Cell: [phone]\par E-mail: [email or web address]

## 2022-12-19 NOTE — HISTORY OF PRESENT ILLNESS
[de-identified] : Ms. Hanson is a 56 yo F with obesity, hypothyroidism, history of breast cancers (left breast cancer s/p mastectomy and left axillary lymph node dissection 2003 followed by radiation, chemotherapy, and tamoxifen; right breast cancer s/p mastectomy 2008 followed by radiation and chemotherapy, with metastatic disease to the chest wall followed by experimental drug therapy as part of a clinical trial in 2012; also s/p bilateral breast implants), prior cholecystectomy (2015), chronic iron deficiency anemia, who is being seen for follow-up of cirrhosis complicated by non-bleeding esophageal and rectal varices, possible covert hepatic encephalopathy, and indeterminate liver lesions.\par \par Oncology: Dr. Bebo Quach ([facility])\par GI: Dr. Sung Juarez = referring physician\par \par She was last seen by me on 7/12/22 and is here today for follow-up. Since her last visit with me, she had EGD with band ligation with Dr. Juarez on 8/10/22, 9/3/22, and 11/18/22. Colonoscopy (8/10/22) showed small non-bleeding rectal varices and had removal of a 4 mm ascending colon polyp that was benign colonic mucosa on pathology. She is scheduled for a repeat EGD on 12/21/22. She denies any melena or hematochezia. Due to prior concern for melena, Dr. Juarez had planned for a repeat capsule study in January, but she is now wondering whether it is still necessary given that she is not having overt bleeding and given her lack of anemia on recent labs.\par \par She reports intermittent abdominal cramping that she says feels "like a karla horse", varies in location, resolves within seconds, and has no clear provoking or palliating factors apart from sometimes being associated with a bowel movement. She is having 2-3 BMs/day with lactulose and denies any brain fog or overt confusion. No abdominal distension or recent weight gain. No lower extremity swelling.\par \par She has not had any further alcohol consumption including no "zero" alcohol beverages since our last visit. She is trying to eat a healthy diet including eliminating sugars and has had some associated intentional weight loss.\par \par She recently started taking a liquid multivitamin that, on review of the ingredients, also contains gingko biloba, alpha-linoic acid, and high levels of vitamin A.\par \par 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 No children. She admits to using marijuana recreationally in the past and being interested in trying medical marijuana. She does not have any history of intranasal or injection drug use.

## 2023-01-24 ENCOUNTER — NON-APPOINTMENT (OUTPATIENT)
Age: 58
End: 2023-01-24

## 2023-04-10 ENCOUNTER — NON-APPOINTMENT (OUTPATIENT)
Age: 58
End: 2023-04-10

## 2023-06-28 ENCOUNTER — NON-APPOINTMENT (OUTPATIENT)
Age: 58
End: 2023-06-28

## 2023-06-30 ENCOUNTER — APPOINTMENT (OUTPATIENT)
Dept: HEPATOLOGY | Facility: CLINIC | Age: 58
End: 2023-06-30

## 2023-09-18 ENCOUNTER — APPOINTMENT (OUTPATIENT)
Dept: MRI IMAGING | Facility: CLINIC | Age: 58
End: 2023-09-18
Payer: COMMERCIAL

## 2023-09-18 ENCOUNTER — OUTPATIENT (OUTPATIENT)
Dept: OUTPATIENT SERVICES | Facility: HOSPITAL | Age: 58
LOS: 1 days | End: 2023-09-18
Payer: COMMERCIAL

## 2023-09-18 DIAGNOSIS — Z90.49 ACQUIRED ABSENCE OF OTHER SPECIFIED PARTS OF DIGESTIVE TRACT: Chronic | ICD-10-CM

## 2023-09-18 DIAGNOSIS — Z90.13 ACQUIRED ABSENCE OF BILATERAL BREASTS AND NIPPLES: Chronic | ICD-10-CM

## 2023-09-18 DIAGNOSIS — I77.1 STRICTURE OF ARTERY: ICD-10-CM

## 2023-09-18 DIAGNOSIS — Z98.89 OTHER SPECIFIED POSTPROCEDURAL STATES: Chronic | ICD-10-CM

## 2023-09-18 DIAGNOSIS — K75.81 NONALCOHOLIC STEATOHEPATITIS (NASH): ICD-10-CM

## 2023-09-18 PROCEDURE — 74183 MRI ABD W/O CNTR FLWD CNTR: CPT

## 2023-09-18 PROCEDURE — 74183 MRI ABD W/O CNTR FLWD CNTR: CPT | Mod: 26

## 2023-09-18 PROCEDURE — A9585: CPT

## 2023-11-02 ENCOUNTER — APPOINTMENT (OUTPATIENT)
Dept: HEPATOLOGY | Facility: CLINIC | Age: 58
End: 2023-11-02
Payer: COMMERCIAL

## 2023-11-02 VITALS
BODY MASS INDEX: 34.38 KG/M2 | OXYGEN SATURATION: 98 % | HEIGHT: 63 IN | TEMPERATURE: 98.1 F | WEIGHT: 194 LBS | SYSTOLIC BLOOD PRESSURE: 139 MMHG | DIASTOLIC BLOOD PRESSURE: 76 MMHG | HEART RATE: 98 BPM

## 2023-11-02 DIAGNOSIS — I77.1 STRICTURE OF ARTERY: ICD-10-CM

## 2023-11-02 DIAGNOSIS — E66.9 OBESITY, UNSPECIFIED: ICD-10-CM

## 2023-11-02 DIAGNOSIS — K74.60 NONALCOHOLIC STEATOHEPATITIS (NASH): ICD-10-CM

## 2023-11-02 DIAGNOSIS — K64.9 UNSPECIFIED HEMORRHOIDS: ICD-10-CM

## 2023-11-02 DIAGNOSIS — K76.82 HEPATIC ENCEPHALOPATHY: ICD-10-CM

## 2023-11-02 DIAGNOSIS — I85.10 SECONDARY ESOPHAGEAL VARICES W/OUT BLEEDING: ICD-10-CM

## 2023-11-02 DIAGNOSIS — K76.9 LIVER DISEASE, UNSPECIFIED: ICD-10-CM

## 2023-11-02 DIAGNOSIS — K75.81 NONALCOHOLIC STEATOHEPATITIS (NASH): ICD-10-CM

## 2023-11-02 PROCEDURE — 99215 OFFICE O/P EST HI 40 MIN: CPT

## 2023-11-02 RX ORDER — LEVOTHYROXINE SODIUM 125 UG/1
125 TABLET ORAL DAILY
Refills: 0 | Status: ACTIVE | COMMUNITY

## 2023-11-02 RX ORDER — MULTIVITAMIN
TABLET ORAL
Refills: 0 | Status: ACTIVE | COMMUNITY

## 2023-11-02 RX ORDER — ESCITALOPRAM OXALATE 20 MG/1
20 TABLET ORAL DAILY
Refills: 0 | Status: ACTIVE | COMMUNITY

## 2023-11-03 LAB
AFP-TM SERPL-MCNC: 2.6 NG/ML
ALBUMIN SERPL ELPH-MCNC: 4.5 G/DL
ALP BLD-CCNC: 89 U/L
ALT SERPL-CCNC: 20 U/L
ANION GAP SERPL CALC-SCNC: 10 MMOL/L
AST SERPL-CCNC: 26 U/L
BASOPHILS # BLD AUTO: 0.03 K/UL
BASOPHILS NFR BLD AUTO: 0.9 %
BILIRUB DIRECT SERPL-MCNC: 0.2 MG/DL
BILIRUB SERPL-MCNC: 0.8 MG/DL
BUN SERPL-MCNC: 13 MG/DL
CALCIUM SERPL-MCNC: 9.9 MG/DL
CHLORIDE SERPL-SCNC: 102 MMOL/L
CO2 SERPL-SCNC: 28 MMOL/L
CREAT SERPL-MCNC: 0.67 MG/DL
EGFR: 101 ML/MIN/1.73M2
EOSINOPHIL # BLD AUTO: 0.12 K/UL
EOSINOPHIL NFR BLD AUTO: 3.6 %
ESTIMATED AVERAGE GLUCOSE: 108 MG/DL
GLUCOSE SERPL-MCNC: 84 MG/DL
HBA1C MFR BLD HPLC: 5.4 %
HCT VFR BLD CALC: 42 %
HGB BLD-MCNC: 13.1 G/DL
IMM GRANULOCYTES NFR BLD AUTO: 0.3 %
INR PPP: 1.09 RATIO
LYMPHOCYTES # BLD AUTO: 0.56 K/UL
LYMPHOCYTES NFR BLD AUTO: 16.9 %
MAN DIFF?: NORMAL
MCHC RBC-ENTMCNC: 30 PG
MCHC RBC-ENTMCNC: 31.2 GM/DL
MCV RBC AUTO: 96.3 FL
MONOCYTES # BLD AUTO: 0.23 K/UL
MONOCYTES NFR BLD AUTO: 6.9 %
NEUTROPHILS # BLD AUTO: 2.36 K/UL
NEUTROPHILS NFR BLD AUTO: 71.4 %
PLATELET # BLD AUTO: 91 K/UL
POTASSIUM SERPL-SCNC: 4.2 MMOL/L
PROT SERPL-MCNC: 7.3 G/DL
PT BLD: 12.3 SEC
RBC # BLD: 4.36 M/UL
RBC # FLD: 14.5 %
SODIUM SERPL-SCNC: 140 MMOL/L
T4 FREE SERPL-MCNC: 1.4 NG/DL
TSH SERPL-ACNC: 1.81 UIU/ML
WBC # FLD AUTO: 3.31 K/UL

## 2023-11-05 PROBLEM — K76.9 LIVER LESION: Status: ACTIVE | Noted: 2022-07-12

## 2023-11-05 PROBLEM — K76.82 HEPATIC ENCEPHALOPATHY: Status: ACTIVE | Noted: 2022-02-17

## 2023-11-05 PROBLEM — E66.9 OBESITY (BMI 30-39.9): Status: ACTIVE | Noted: 2022-02-17

## 2023-11-05 PROBLEM — I77.1 CELIAC ARTERY STENOSIS: Status: ACTIVE | Noted: 2022-12-14

## 2024-07-08 ENCOUNTER — NON-APPOINTMENT (OUTPATIENT)
Age: 59
End: 2024-07-08

## 2024-07-09 ENCOUNTER — APPOINTMENT (OUTPATIENT)
Dept: HEPATOLOGY | Facility: CLINIC | Age: 59
End: 2024-07-09

## 2024-07-17 ENCOUNTER — LABORATORY RESULT (OUTPATIENT)
Age: 59
End: 2024-07-17

## 2024-07-17 ENCOUNTER — APPOINTMENT (OUTPATIENT)
Dept: HEPATOLOGY | Facility: CLINIC | Age: 59
End: 2024-07-17

## 2024-07-17 VITALS
DIASTOLIC BLOOD PRESSURE: 75 MMHG | OXYGEN SATURATION: 98 % | HEIGHT: 63 IN | BODY MASS INDEX: 33.66 KG/M2 | SYSTOLIC BLOOD PRESSURE: 115 MMHG | WEIGHT: 190 LBS | RESPIRATION RATE: 16 BRPM | HEART RATE: 92 BPM | TEMPERATURE: 97.3 F

## 2024-07-17 DIAGNOSIS — K75.81 NONALCOHOLIC STEATOHEPATITIS (NASH): ICD-10-CM

## 2024-07-17 DIAGNOSIS — I81 PORTAL VEIN THROMBOSIS: ICD-10-CM

## 2024-07-17 DIAGNOSIS — E66.9 OBESITY, UNSPECIFIED: ICD-10-CM

## 2024-07-17 DIAGNOSIS — K74.60 NONALCOHOLIC STEATOHEPATITIS (NASH): ICD-10-CM

## 2024-07-17 DIAGNOSIS — K76.82 HEPATIC ENCEPHALOPATHY: ICD-10-CM

## 2024-07-17 DIAGNOSIS — I85.10 SECONDARY ESOPHAGEAL VARICES W/OUT BLEEDING: ICD-10-CM

## 2024-07-17 LAB
AFP-TM SERPL-MCNC: 2.1 NG/ML
ALBUMIN SERPL ELPH-MCNC: 4.4 G/DL
ALP BLD-CCNC: 75 U/L
ALT SERPL-CCNC: 23 U/L
ANION GAP SERPL CALC-SCNC: 14 MMOL/L
AST SERPL-CCNC: 35 U/L
BILIRUB SERPL-MCNC: 0.8 MG/DL
BUN SERPL-MCNC: 11 MG/DL
CALCIUM SERPL-MCNC: 9.6 MG/DL
CHLORIDE SERPL-SCNC: 101 MMOL/L
CHOLEST SERPL-MCNC: 197 MG/DL
CO2 SERPL-SCNC: 26 MMOL/L
CREAT SERPL-MCNC: 0.63 MG/DL
EGFR: 101 ML/MIN/1.73M2
GLUCOSE SERPL-MCNC: 93 MG/DL
HDLC SERPL-MCNC: 74 MG/DL
INR PPP: 1.14 RATIO
LDLC SERPL CALC-MCNC: 108 MG/DL
NONHDLC SERPL-MCNC: 123 MG/DL
POTASSIUM SERPL-SCNC: 4.8 MMOL/L
PROT SERPL-MCNC: 7.6 G/DL
PT BLD: 12.8 SEC
SODIUM SERPL-SCNC: 141 MMOL/L
TRIGL SERPL-MCNC: 86 MG/DL

## 2024-07-17 PROCEDURE — G2211 COMPLEX E/M VISIT ADD ON: CPT

## 2024-07-17 PROCEDURE — 99214 OFFICE O/P EST MOD 30 MIN: CPT

## 2024-07-19 LAB
HBV CORE IGG+IGM SER QL: NONREACTIVE
HBV SURFACE AB SERPL IA-ACNC: <3 MIU/ML
HBV SURFACE AG SER QL: NONREACTIVE
HCT VFR BLD CALC: 42.3 %
HCV AB SER QL: NONREACTIVE
HCV S/CO RATIO: 0.13 S/CO
HEPATITIS A IGG ANTIBODY: NONREACTIVE
HGB BLD-MCNC: 13.4 G/DL
MCHC RBC-ENTMCNC: 30.2 PG
MCHC RBC-ENTMCNC: 31.7 GM/DL
MCV RBC AUTO: 95.3 FL
PLATELET # BLD AUTO: 83 K/UL
RBC # BLD: 4.44 M/UL
RBC # FLD: 15 %
WBC # FLD AUTO: 3.28 K/UL

## 2024-07-23 LAB
PETH 16:0/18:1: NEGATIVE NG/ML
PETH 16:0/18:2: NEGATIVE NG/ML
PETH COMMENTS: NORMAL

## 2024-08-13 ENCOUNTER — APPOINTMENT (OUTPATIENT)
Dept: MRI IMAGING | Facility: CLINIC | Age: 59
End: 2024-08-13
Payer: COMMERCIAL

## 2024-08-13 PROCEDURE — 74183 MRI ABD W/O CNTR FLWD CNTR: CPT | Mod: 26

## 2024-08-16 ENCOUNTER — APPOINTMENT (OUTPATIENT)
Dept: HEPATOLOGY | Facility: CLINIC | Age: 59
End: 2024-08-16

## 2024-08-16 NOTE — HISTORY OF PRESENT ILLNESS
[de-identified] : Ms. Hanson is a 60 yo F with obesity, hypothyroidism, history of breast cancers (left breast cancer s/p mastectomy and left axillary lymph node dissection 2003 followed by radiation, chemotherapy, and tamoxifen; right breast cancer s/p mastectomy 2008 followed by radiation and chemotherapy, with metastatic disease to the chest wall followed by experimental drug therapy as part of a clinical trial in 2012; also s/p bilateral breast implants), prior cholecystectomy (2015), chronic iron deficiency anemia, asymptomatic celiac artery stenosis, and cirrhosis likely secondary to metabolic dysfunction-associated steatotic liver disease with prior risky alcohol consumption (MetALD) and complicated by non-bleeding esophageal and rectal varices, likely subclinical (covert) hepatic encephalopathy, nonocclusive PVT (newly seen on outside CT in 5/2024), and prior indeterminate liver lesions (with more recent MRIs suggestive of benign vascular shunts).  Oncology: Dr. Bebo Quach ([facility]) GI: Dr. Sung Juarez = referring physician  She was last seen by me on 11/2/23 and is here today for follow-up. She is scheduled to see Dr. Juarez next week to discuss scheduling her next EGD (was due in 5/2024 but she did not do it yet). She had a CT CAP with IV contrast ordered by Dr. Quach for breast cancer surveillance and done in 5/2024 at [facility]. 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 She says that no one had called her about the result (and we were not aware of it until she showed us the report today). She is not on anticoagulation. She recently started a diet program at [facility] since her GLP1 was denied by insurance in 11/2023. Her wife is also doing the same diet program. She denies any overt confusion and was off lactulose for several months but recently restarted it. She has occasional mental fogginess but does not know if the lactulose is helping yet as she only re-started it a few days ago, now taking it most mornings or when home and typically having 2-3 BMs/day. No overt GI bleeding. No abdominal pain or distension. No lower extremity swelling. No dyspnea. She complains of intermittent muscle cramping that has not been alleviated with drinking diet tonic water.  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 No children. She admits to using marijuana recreationally in the past and being interested in trying medical marijuana. She does not have any history of intranasal or injection drug use. She used to drink alcohol occasionally but stopped after our prior discussions in 2022.

## 2024-08-16 NOTE — ASSESSMENT
[FreeTextEntry1] : # Portal vein thrombus: - Based on the report of her outside CT CAP with IV contrast (5/2024), she had new partially occlusive PVT in the main portal vein and extending in the right and left portal veins. She is currently asymptomatic. - MRI abdomen with and without contrast ordered today for close surveillance to reassess clot burden. - Depending on the MRI results, we will decide whether to start anticoagulation likely with DOAC.  # Subclinical (covert) hepatic encephalopathy: - No history of overt HE, but still having some mental fogginess though was not taking lactulose. - Continue lactulose (recently re-started), titrated as needed to maintain 2-4 BMs/day. She has been tolerating this therapy. - If her subclinical HE symptoms do not adequately improve with lactulose, we will re-consider adding rifaximin as second agent at her next visit. - She was advised to promptly seek medical attention if any overt HE develops.  # Non-bleeding esophageal and rectal varices: - Poor candidate for NSBB for primary prophylaxis due to a history of episodic vasovagal syncope and intermittent lightheadedness that NSBB may exacerbate. - She last underwent EVL with Dr. Juarez in 1/2023, with last EGD (5/2023) with no EVL needed. - She is overdue for variceal surveillance (was due 5/2024) but will be seeing Dr. Juarez soon to schedule it. - We previously discussed that TIPS can be used to alleviate portal hypertension and prevent bleeding from varices, but that this is not currently indicated given that she has no prior episodes of hemorrhage or other indications for elective TIPS (unless worsening clot burden despite anticoagulation as above).  # Muscle cramping: - She was advised to use tonic water and to add a daily taurine supplement. We also discussed trying pickle juice. - If symptoms worsen, we can consider adding baclofen PRN at her next visit.  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 Prior laboratory work-up for other causes of cirrhosis was unremarkable. It is also possible that prior chemotherapy may have contributed to development of cirrhosis and/or portal hypertension. - She currently has Child-Salas class A cirrhosis with low MELD 3.0 score based on her most recent labs (7/17) ABO A. Continue to monitor liver tests q6months, due next 01/2025.  - No ascites on prior imaging or on exam today.  - Continue [id number] HCC screening. Recent MRI (8/13) ___, repeat imaging with abd US, ordered for 02/2025. AFP <8.3 ng/mL (7/17). Repeat with next labs, ordered for 01/2025.   - We will consider evaluation for liver transplant if she has worsening liver function and/or worsening portal hypertensive complications. We have discussed the potential risks of development of ascites, SBP or other infections, variceal hemorrhage, overt hepatic encephalopathy, PVT, and HCC and alert signs for her to seek medical attention.  # Indeterminate liver lesions: - Based on prior MRI (12/2021), she appeared to have two subcentimeter lesions with arterial enhancement, without washout or restricted diffusion, with retained contrast on the hepatobiliary phase. LIRADS classification was not used in the report but by description these are LR-3 lesions (indeterminate). Additional arterially isointense nodules with hyperintensity on hepatobiliary phase were also noted. The lesions appeared to be overall stable compared to her prior MRI (7/2021) when they were also noted to be <1.5 cm in size. - Most recent MRI (9/18/23) with no suspicious hepatic lesions noted, with prior lesions compatible with shunts. - Repeat MRI abdomen with and without contrast ordered today, as above.   # Obesity: - We have discussed potential strategies for gradual weight loss to improve her metabolic and cardiovascular health. We discussed adhering to a Mediterranean style diet (with plate method discussed today) and also discussed methods for better dietary accountability including participating in a structured program (such as the [facility] [place] or, alternatively, Weight Watchers or a smart phone based application such as Lose It! or NoWhat's Hot). - We discussed risks and benefits of Contrave and the need to properly dose the bupropion if she does start the medication given her cirrhosis. She will continue to follow with the [facility] for weight loss.  - The risks and benefits of semaglutide (Ozempic/Wegovy) or tirzepatide (Mounjaro/Zepbound) therapy were explained at length. Common adverse effects include early satiety, nausea, vomiting, diarrhea, constipation, and hypoglycemia. Most of these effects are mild and may be modifiable with dose changes. Benefits include potential weight loss of 10-15% based on clinical trials and major cardiovascular disease reduction in those with type 2 diabetes with established cardiovascular or chronic kidney disease. Rare but serious risks include pancreatitis and medullary thyroid cancer. The patient does not have a known history of gastroparesis, history of pancreatitis, or a personal/family history of medullary thyroid cancer or multiple endocrine neoplasia 2A or 2B. She preferred to defer therapy for now.  # Health maintenance in cirrhosis: - We previously discussed the importance of 100% alcohol abstinence including no "zero alcohol" drinks that do still continue small amounts of alcohol. She is now sober for >1 year. She is aware that we will do periodic biomarker surveillance. She declined AUD pharmacotherapy previously. - Re-check [facility] today. - Ms. GLASS was counseled to: avoid use of herbal and dietary supplements due to potential hepatotoxicity (including advised to stop her current herbal ingredient-containing liquid multivitamin that has multiple potential hepatotoxins and switch to a basic non-herbal containing multivitamin); limit use of acetaminophen to <2 grams per day; avoid use of nonsteroidal antiinflammatory drugs (NSAIDs) as these can lead to diuretic resistance and precipitate renal dysfunction in patients with advanced liver disease; avoid eating any unpasteurized dairy products; avoid eating any raw or undercooked eggs, fish, poultry, or meat; and avoid eating raw/steamed oysters or other shellfish to avoid risk of Vibrio infection.  Next follow-up: 1 month

## 2024-08-16 NOTE — PHYSICAL EXAM
[Scleral Icterus] : No Scleral Icterus [Spider Angioma] : Spider angioma(s) was(were) observed [Abdominal  Ascites] : no ascites [Splenomegaly] : no splenomegaly [Caput Medusae] : no caput medusae observed [Non-Tender] : non-tender [Irregular] : irregular [Asterixis] : no asterixis observed [Jaundice] : No jaundice [Palmar Erythema] : no Palmar Erythema [General Appearance - Alert] : alert [General Appearance - In No Acute Distress] : in no acute distress [General Appearance - Well Nourished] : well nourished [General Appearance - Well Developed] : well developed [General Appearance - Well-Appearing] : healthy appearing [Sclera] : the sclera and conjunctiva were normal [Hearing Threshold Finger Rub Not [place]] : hearing was normal [Oropharynx] : the oropharynx was normal [Neck Appearance] : the appearance of the neck was normal [Neck Cervical Mass (___cm)] : no neck mass was observed [Jugular Venous Distention Increased] : there was no jugular-venous distention [Respiration, Rhythm And Depth] : normal respiratory rhythm and effort [Exaggerated Use Of Accessory Muscles For Inspiration] : no accessory muscle use [Auscultation Breath Sounds / Voice Sounds] : lungs were clear to auscultation bilaterally [Heart Rate And Rhythm] : heart rate was normal and rhythm regular [Heart Sounds] : normal S1 and S2 [Murmurs] : no murmurs [Edema] : there was no peripheral edema [Bowel Sounds] : normal bowel sounds [Abdomen Soft] : soft [Abdomen Tenderness] : non-tender [Abdomen Mass (___ Cm)] : no abdominal mass palpated [FreeTextEntry1] : +central adiposity [Abnormal Walk] : normal gait [Nail Clubbing] : no clubbing  or cyanosis of the fingernails [Involuntary Movements] : no involuntary movements were seen [Skin Color & Pigmentation] : normal skin color and pigmentation [Skin Turgor] : normal skin turgor [] : no rash [Oriented To Time, Place, And Person] : oriented to person, place, and time [Impaired Insight] : insight and judgment were intact [Affect] : the affect was normal [Mood] : the mood was normal [Memory Recent] : recent memory was not impaired [Memory Remote] : remote memory was not impaired

## 2024-08-20 ENCOUNTER — NON-APPOINTMENT (OUTPATIENT)
Age: 59
End: 2024-08-20

## 2024-08-20 DIAGNOSIS — C22.0 LIVER CELL CARCINOMA: ICD-10-CM

## 2024-09-04 ENCOUNTER — TRANSCRIPTION ENCOUNTER (OUTPATIENT)
Age: 59
End: 2024-09-04

## 2024-09-04 ENCOUNTER — RESULT REVIEW (OUTPATIENT)
Age: 59
End: 2024-09-04

## 2024-09-04 ENCOUNTER — OUTPATIENT (OUTPATIENT)
Dept: OUTPATIENT SERVICES | Facility: HOSPITAL | Age: 59
LOS: 1 days | End: 2024-09-04
Payer: COMMERCIAL

## 2024-09-04 ENCOUNTER — APPOINTMENT (OUTPATIENT)
Dept: NUCLEAR MEDICINE | Facility: IMAGING CENTER | Age: 59
End: 2024-09-04
Payer: COMMERCIAL

## 2024-09-04 ENCOUNTER — APPOINTMENT (OUTPATIENT)
Dept: CT IMAGING | Facility: IMAGING CENTER | Age: 59
End: 2024-09-04
Payer: COMMERCIAL

## 2024-09-04 DIAGNOSIS — Z90.49 ACQUIRED ABSENCE OF OTHER SPECIFIED PARTS OF DIGESTIVE TRACT: Chronic | ICD-10-CM

## 2024-09-04 DIAGNOSIS — Z00.8 ENCOUNTER FOR OTHER GENERAL EXAMINATION: ICD-10-CM

## 2024-09-04 DIAGNOSIS — Z90.13 ACQUIRED ABSENCE OF BILATERAL BREASTS AND NIPPLES: Chronic | ICD-10-CM

## 2024-09-04 DIAGNOSIS — Z98.89 OTHER SPECIFIED POSTPROCEDURAL STATES: Chronic | ICD-10-CM

## 2024-09-04 DIAGNOSIS — C22.0 LIVER CELL CARCINOMA: ICD-10-CM

## 2024-09-04 PROCEDURE — 78306 BONE IMAGING WHOLE BODY: CPT | Mod: 26

## 2024-09-04 PROCEDURE — 78830 RP LOCLZJ TUM SPECT W/CT 1: CPT | Mod: 26

## 2024-09-04 PROCEDURE — 71250 CT THORAX DX C-: CPT

## 2024-09-04 PROCEDURE — 71250 CT THORAX DX C-: CPT | Mod: 26

## 2024-09-04 PROCEDURE — A9561: CPT

## 2024-09-04 PROCEDURE — 78306 BONE IMAGING WHOLE BODY: CPT

## 2024-09-04 PROCEDURE — 78830 RP LOCLZJ TUM SPECT W/CT 1: CPT

## 2024-09-04 NOTE — ASSESSMENT
[FreeTextEntry1] : This is a 60 yo F with pmhx of obesity, hypothyroidism, breast cancers (left breast cancer s/p mastectomy and left axillary lymph node dissection 2003 followed by radiation, chemotherapy, and tamoxifen; right breast cancer s/p mastectomy 2008 followed by radiation and chemotherapy, with metastatic disease to the chest wall followed by experimental drug therapy as part of a clinical trial in 2012; also s/p bilateral breast implants), prior cholecystectomy (2015), chronic iron deficiency anemia, asymptomatic celiac artery stenosis, and cirrhosis likely secondary to metabolic dysfunction-associated steatotic liver disease with prior risky alcohol consumption (MetALD) and complicated by non-bleeding esophageal and rectal varices, likely subclinical (covert) hepatic encephalopathy, nonocclusive PVT (newly seen on outside CT in 5/2024), and prior indeterminate liver lesions (with more recent MRIs suggestive of benign vascular shunts) who presents today for consultation for liver directed therapy.   1.  HCC - pt found to have indeterminate lesions first on imaging in 12/2021, but f/u imaging in 2023 without any lesions, believed prior lesions to be due to shunts. - now with recent MRI on 8/13/24 with 3 lesions, including a: 1.1 x 1.0 cm segment 8 LR-5 OPTN 5a 1.8 x 1.4 cm segment 7 LR-5 OPTN 5a 1.2 x 0.9 cm segment 8 LR-3 - imaging reviewed and d/w patient - Recommending treatment with radioembolization - I reviewed the procedure, including the mapping angiogram, risks (infection, bleeding, elevated LFTs, WALE), benefits, alternatives, and expected post procedure course. - I reviewed post procedure radiation safety precautions - chest CT 9/4 - NM bone scan 9/4 - AFP nonsecreter - continue to follow with Dr. Rodriguez for transplant w/u.  2.  PVT - found to have new partially occlusive PVT in the main portal vein and extending in the right and left portal veins on imaging from 5/2024. - recent MRI with improving PVT. Per Dr. Rodriguez's notes, a/c to be deferred for now since she is scheduled for EGD with Dr. Juarez in late September.  - continue to follow with Dr. Rodriguez for management  I have provided the patient the opportunity to ask questions and have answered them to their satisfaction.  They are encouraged to contact our office with any further questions, concerns, or issues. [SIRT Procedure & Planning] : SIRT procedure and planning discussed at length

## 2024-09-04 NOTE — HISTORY OF PRESENT ILLNESS
[FreeTextEntry1] : This is a 60 yo F with pmhx of obesity, hypothyroidism, breast cancers (left breast cancer s/p mastectomy and left axillary lymph node dissection 2003 followed by radiation, chemotherapy, and tamoxifen; right breast cancer s/p mastectomy 2008 followed by radiation and chemotherapy, with metastatic disease to the chest wall followed by experimental drug therapy as part of a clinical trial in 2012; also s/p bilateral breast implants), prior cholecystectomy (2015), chronic iron deficiency anemia, asymptomatic celiac artery stenosis, and cirrhosis likely secondary to metabolic dysfunction-associated steatotic liver disease with prior risky alcohol consumption (MetALD) and complicated by non-bleeding esophageal and rectal varices, likely subclinical (covert) hepatic encephalopathy, nonocclusive PVT (newly seen on outside CT in 5/2024), and prior indeterminate liver lesions (with more recent MRIs suggestive of benign vascular shunts) who presents today for consultation for liver directed therapy.   Pt follows with Dr. Rodriguez and was sent for f/u scan.  Imaging with 2 LR 5 lesions and 1 LR3 lesion, so she is now referred to IR by Dr. Rodriguez to discuss liver directed therapy.   She denies any overt confusion and was off lactulose for several months but recently restarted it. She has occasional mental fogginess but does not know if the lactulose is helping yet as she only re-started it a few days ago, now taking it most mornings or when home and typically having 2-3 BMs/day. No overt GI bleeding. No abdominal pain or distension. No lower extremity swelling. No dyspnea. She complains of intermittent muscle cramping that has not been alleviated with drinking diet tonic water.  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 No children. She admits to using marijuana recreationally in the past and being interested in trying medical marijuana. She does not have any history of intranasal or injection drug use. She used to drink alcohol occasionally but stopped after our prior discussions in 2022.   Oncology: Dr. Bebo Quach ([facility]) GI: Dr. Sung Juarez Hep: Jennifer

## 2024-09-05 ENCOUNTER — NON-APPOINTMENT (OUTPATIENT)
Age: 59
End: 2024-09-05

## 2024-09-05 ENCOUNTER — APPOINTMENT (OUTPATIENT)
Dept: INTERVENTIONAL RADIOLOGY/VASCULAR | Facility: CLINIC | Age: 59
End: 2024-09-05

## 2025-06-26 ENCOUNTER — APPOINTMENT (OUTPATIENT)
Dept: ORTHOPEDIC SURGERY | Facility: CLINIC | Age: 60
End: 2025-06-26
Payer: COMMERCIAL

## 2025-06-26 VITALS — HEIGHT: 63 IN | WEIGHT: 148 LBS | BODY MASS INDEX: 26.22 KG/M2

## 2025-06-26 PROBLEM — Z87.19 HISTORY OF HEPATIC DISEASE: Status: RESOLVED | Noted: 2025-06-26 | Resolved: 2025-06-26

## 2025-06-26 PROBLEM — Z87.19 HISTORY OF HIATAL HERNIA: Status: RESOLVED | Noted: 2025-06-26 | Resolved: 2025-06-26

## 2025-06-26 PROBLEM — S82.61XA CLOSED DISPLACED FRACTURE OF LATERAL MALLEOLUS OF RIGHT FIBULA, INITIAL ENCOUNTER: Status: ACTIVE | Noted: 2025-06-26

## 2025-06-26 PROBLEM — Z87.898 HISTORY OF SPLENOMEGALY: Status: RESOLVED | Noted: 2025-06-26 | Resolved: 2025-06-26

## 2025-06-26 PROBLEM — Z86.39 HISTORY OF HYPOTHYROIDISM: Status: RESOLVED | Noted: 2025-06-26 | Resolved: 2025-06-26

## 2025-06-26 PROCEDURE — 99205 OFFICE O/P NEW HI 60 MIN: CPT

## 2025-06-26 RX ORDER — IBUPROFEN 800 MG/1
800 TABLET, FILM COATED ORAL 3 TIMES DAILY
Qty: 90 | Refills: 0 | Status: ACTIVE | COMMUNITY
Start: 2025-06-26 | End: 1900-01-01

## 2025-07-03 ENCOUNTER — APPOINTMENT (OUTPATIENT)
Dept: ORTHOPEDIC SURGERY | Facility: HOSPITAL | Age: 60
End: 2025-07-03
Payer: COMMERCIAL

## 2025-07-03 PROCEDURE — 27829 TREAT LOWER LEG JOINT: CPT | Mod: AS,RT

## 2025-07-03 PROCEDURE — 27829 TREAT LOWER LEG JOINT: CPT | Mod: RT

## 2025-07-03 PROCEDURE — 27792 TREATMENT OF ANKLE FRACTURE: CPT | Mod: RT

## 2025-07-03 PROCEDURE — 27792 TREATMENT OF ANKLE FRACTURE: CPT | Mod: AS,RT

## 2025-07-10 ENCOUNTER — NON-APPOINTMENT (OUTPATIENT)
Age: 60
End: 2025-07-10

## 2025-07-18 ENCOUNTER — APPOINTMENT (OUTPATIENT)
Dept: ORTHOPEDIC SURGERY | Facility: CLINIC | Age: 60
End: 2025-07-18
Payer: COMMERCIAL

## 2025-07-18 PROBLEM — S93.492A SPRAIN OF ANTERIOR TALOFIBULAR LIGAMENT OF LEFT ANKLE, INITIAL ENCOUNTER: Status: ACTIVE | Noted: 2025-06-26

## 2025-07-18 PROCEDURE — 99024 POSTOP FOLLOW-UP VISIT: CPT

## 2025-07-18 PROCEDURE — 73610 X-RAY EXAM OF ANKLE: CPT | Mod: RT

## 2025-08-12 ENCOUNTER — APPOINTMENT (OUTPATIENT)
Dept: ORTHOPEDIC SURGERY | Facility: CLINIC | Age: 60
End: 2025-08-12
Payer: COMMERCIAL

## 2025-08-12 VITALS — BODY MASS INDEX: 26.22 KG/M2 | WEIGHT: 148 LBS | HEIGHT: 63 IN

## 2025-08-12 DIAGNOSIS — S82.61XD DISPLACED FRACTURE OF LATERAL MALLEOLUS OF RIGHT FIBULA, SUBSEQUENT ENCOUNTER FOR CLOSED FRACTURE WITH ROUTINE HEALING: ICD-10-CM

## 2025-08-12 PROCEDURE — 99024 POSTOP FOLLOW-UP VISIT: CPT

## 2025-08-12 PROCEDURE — 73610 X-RAY EXAM OF ANKLE: CPT | Mod: RT

## 2025-09-19 ENCOUNTER — APPOINTMENT (OUTPATIENT)
Dept: ORTHOPEDIC SURGERY | Facility: CLINIC | Age: 60
End: 2025-09-19
Payer: COMMERCIAL

## 2025-09-19 DIAGNOSIS — S82.61XD DISPLACED FRACTURE OF LATERAL MALLEOLUS OF RIGHT FIBULA, SUBSEQUENT ENCOUNTER FOR CLOSED FRACTURE WITH ROUTINE HEALING: ICD-10-CM

## 2025-09-19 PROCEDURE — 73610 X-RAY EXAM OF ANKLE: CPT | Mod: RT

## 2025-09-19 PROCEDURE — 99024 POSTOP FOLLOW-UP VISIT: CPT
